# Patient Record
Sex: MALE | Race: WHITE | Employment: FULL TIME | URBAN - METROPOLITAN AREA
[De-identification: names, ages, dates, MRNs, and addresses within clinical notes are randomized per-mention and may not be internally consistent; named-entity substitution may affect disease eponyms.]

---

## 2020-03-05 ENCOUNTER — OFFICE VISIT (OUTPATIENT)
Dept: SLEEP MEDICINE | Age: 35
End: 2020-03-05

## 2020-03-05 VITALS
DIASTOLIC BLOOD PRESSURE: 88 MMHG | HEART RATE: 76 BPM | RESPIRATION RATE: 19 BRPM | TEMPERATURE: 98.4 F | WEIGHT: 210 LBS | SYSTOLIC BLOOD PRESSURE: 137 MMHG | BODY MASS INDEX: 27.83 KG/M2 | HEIGHT: 73 IN | OXYGEN SATURATION: 98 %

## 2020-03-05 DIAGNOSIS — G47.33 OBSTRUCTIVE SLEEP APNEA (ADULT) (PEDIATRIC): Primary | ICD-10-CM

## 2020-03-05 RX ORDER — FLUOXETINE HYDROCHLORIDE 40 MG/1
CAPSULE ORAL DAILY
COMMUNITY

## 2020-03-05 RX ORDER — DICYCLOMINE HYDROCHLORIDE 20 MG/1
20 TABLET ORAL EVERY 6 HOURS
COMMUNITY

## 2020-03-05 NOTE — PATIENT INSTRUCTIONS
[address] [facility] Seth. [place], [address]  Tel.  [phone]  Fax. 100 [facility] 60  [place], [address] [facility]  Tel.  [phone]  Fax. [phone] [address]  Tel.  [phone]  Fax. [phone]     Sleep Apnea: After Your Visit  Your Care Instructions  Sleep apnea occurs when you frequently stop breathing for 10 seconds or longer during sleep. It can be mild to severe, based on the number of times per hour that you stop breathing or have slowed breathing. Blocked or narrowed airways in your nose, mouth, or throat can cause sleep apnea. Your airway can become blocked when your throat muscles and tongue relax during sleep. Sleep apnea is common, occurring in 1 out of 20 individuals. Individuals having any of the following characteristics should be evaluated and treated right away due to high risk and detrimental consequences from untreated sleep apnea:  1. Obesity  2. Congestive Heart failure  3. Atrial Fibrillation  4. Uncontrolled Hypertension  5. Type II Diabetes  6. Night-time Arrhythmias  7. Stroke  8. Pulmonary Hypertension  9. High-risk Driving Populations (pilots, truck drivers, etc.)  10. Patients Considering Weight-loss Surgery    How do you know you have sleep apnea? You probably have sleep apnea if you answer 'yes' to 3 or more of the following questions:  S - Have you been told that you Snore? T - Are you often Tired during the day? O - Has anyone Observed you stop breathing while sleeping? P- Do you have (or are being treated for) high blood Pressure? B - Are you obese (Body Mass Index > 35)? A - Is your Age 48years old or older? N - Is your Neck size greater than 16 inches? G - Are you male Gender? A sleep physician can prescribe a breathing device that prevents tissues in the throat from blocking your airway.  Or your doctor may recommend using a dental device (oral breathing device) to help keep your airway open. In some cases, surgery may be needed to remove enlarged tissues in the throat. Follow-up care is a key part of your treatment and safety. Be sure to make and go to all appointments, and call your doctor if you are having problems. It's also a good idea to know your test results and keep a list of the medicines you take. How can you care for yourself at home? · Lose weight, if needed. It may reduce the number of times you stop breathing or have slowed breathing. · Go to bed at the same time every night. · Sleep on your side. It may stop mild apnea. If you tend to roll onto your back, sew a pocket in the back of your pajama top. Put a tennis ball into the pocket, and stitch the pocket shut. This will help keep you from sleeping on your back. · Avoid alcohol and medicines such as sleeping pills and sedatives before bed. · Do not smoke. Smoking can make sleep apnea worse. If you need help quitting, talk to your doctor about stop-smoking programs and medicines. These can increase your chances of quitting for good. · Prop up the head of your bed 4 to 6 inches by putting bricks under the legs of the bed. · Treat breathing problems, such as a stuffy nose, caused by a cold or allergies. · Use a continuous positive airway pressure (CPAP) breathing machine if lifestyle changes do not help your apnea and your doctor recommends it. The machine keeps your airway from closing when you sleep. · If CPAP does not help you, ask your doctor whether you should try other breathing machines. A bilevel positive airway pressure machine has two types of air pressureâone for breathing in and one for breathing out. Another device raises or lowers air pressure as needed while you breathe. · If your nose feels dry or bleeds when using one of these machines, talk with your doctor about increasing moisture in the air. A humidifier may help.   · If your nose is runny or stuffy from using a breathing machine, talk with your doctor about using decongestants or a corticosteroid nasal spray. When should you call for help? Watch closely for changes in your health, and be sure to contact your doctor if:  · You still have sleep apnea even though you have made lifestyle changes. · You are thinking of trying a device such as CPAP. · You are having problems using a CPAP or similar machine. Where can you learn more? Go to [employer]. Enter S852 in the search box to learn more about \"Sleep Apnea: After Your Visit. \"   © 8604-3104 Healthwise, Incorporated. Care instructions adapted under license by [facility] [employer] (which disclaims liability or warranty for this information). This care instruction is for use with your licensed healthcare professional. If you have questions about a medical condition or this instruction, always ask your healthcare professional. Santy Tubbs any warranty or liability for your use of this information. PROPER SLEEP HYGIENE    What to avoid  · Do not have drinks with caffeine, such as coffee or black tea, for 8 hours before bed. · Do not smoke or use other types of tobacco near bedtime. Nicotine is a stimulant and can keep you awake. · Avoid drinking alcohol late in the evening, because it can cause you to wake in the middle of the night. · Do not eat a big meal close to bedtime. If you are hungry, eat a light snack. · Do not drink a lot of water close to bedtime, because the need to urinate may wake you up during the night. · Do not read or watch TV in bed. Use the bed only for sleeping and sexual activity. What to try  · Go to bed at the same time every night, and wake up at the same time every morning. Do not take naps during the day. · Keep your bedroom quiet, dark, and cool. · Get regular exercise, but not within 3 to 4 hours of your bedtime. .  · Sleep on a comfortable pillow and mattress.   · If watching the clock makes you anxious, turn it facing away from you so you cannot see the time. · If you worry when you lie down, start a worry book. Well before bedtime, write down your worries, and then set the book and your concerns aside. · Try meditation or other relaxation techniques before you go to bed. · If you cannot fall asleep, get up and go to another room until you feel sleepy. Do something relaxing. Repeat your bedtime routine before you go to bed again. · Make your house quiet and calm about an hour before bedtime. Turn down the lights, turn off the TV, log off the computer, and turn down the volume on music. This can help you relax after a busy day. Drowsy Driving  The [address] [place] [address] Road Traffic Safety Administration cites drowsiness as a causing factor in more than 868,448 police reported crashes annually, resulting in 76,000 injuries and 1,500 deaths. Other surveys suggest 55% of people polled have driven while drowsy in the past year, 23% had fallen asleep but not crashed, 3% crashed, and 2% had and accident due to drowsy driving. Who is at risk? Young Drivers: One study of drowsy driving accidents states that 55% of the drivers were under 25 years. Of those, 75% were male. Shift Workers and Travelers: People who work overnight or travel across time zones frequently are at higher risk of experiencing Circadian Rhythm Disorders. They are trying to work and function when their body is programed to sleep. Sleep Deprived: Lack of sleep has a serious impact on your ability to pay attention or focus on a task. Consistently getting less than the average of 8 hours your body needs creates partial or cumulative sleep deprivation. Untreated Sleep Disorders: Sleep Apnea, Narcolepsy, R.L.S., and other sleep disorders (untreated) prevent a person from getting enough restful sleep. This leads to excessive daytime sleepiness and increases the risk for drowsy driving accidents by up to 7 times.   Medications / Alcohol: Even over the counter medications can cause drowsiness. Medications that impair a drivers attention should have a warning label. Alcohol naturally makes you sleepy and on its own can cause accidents. Combined with excessive drowsiness its effects are amplified. Signs of Drowsy Driving:   * You don't remember driving the last few miles   * You may drift out of your brandon   * You are unable to focus and your thoughts wander   * You may yawn more often than normal   * You have difficulty keeping your eyes open / nodding off   * Missing traffic signs, speeding, or tailgating  Prevention-   Good sleep hygiene, lifestyle and behavioral choices have the most impact on drowsy driving. There is no substitute for sleep and the average person requires 8 hours nightly. If you find yourself driving drowsy, stop and sleep. Consider the sleep hygiene tips provided during your visit as well. Medication Refill Policy: Refills for all medications require 1 week advance notice. Please have your pharmacy fax a refill request. We are unable to fax, or call in \"controled substance\" medications and you will need to pick these prescriptions up from our office. [employer] Activation    Thank you for requesting access to [employer]. Please follow the instructions below to securely access and download your online medical record. [employer] allows you to send messages to your doctor, view your test results, renew your prescriptions, schedule appointments, and more. How Do I Sign Up? 1. In your internet browser, go to [email or web address]. 2. Click on the First Time User? Click Here link in the Sign In box. You will see the New Member Sign Up page. 3. Enter your [employer] Access Code exactly as it appears below. You will not need to use this code after youve completed the sign-up process. If you do not sign up before the expiration date, you must request a new code.     [employer] Access Code: [email or web address]  Expires: 4/19/2020  4:39 PM (This is the date your [employer] access code will )    4. Enter the last four digits of your Social Security Number (xxxx) and Date of Birth (mm/dd/yyyy) as indicated and click Submit. You will be taken to the next sign-up page. 5. Create a [employer] Swedent ID. This will be your [employer] login ID and cannot be changed, so think of one that is secure and easy to remember. 6. Create a [employer] password. You can change your password at any time. 7. Enter your Password Reset Question and Answer. This can be used at a later time if you forget your password. 8. Enter your e-mail address. You will receive e-mail notification when new information is available in [address] 9. Click Sign Up. You can now view and download portions of your medical record. 10. Click the Download Summary menu link to download a portable copy of your medical information. Additional Information    If you have questions, please call [phone]. Remember, [employer] is NOT to be used for urgent needs. For medical emergencies, dial 911.

## 2020-03-05 NOTE — PROGRESS NOTES
217 [facility] Seth. [place], [address]  Tel.  [phone]  Fax. 100 [facility] 60  [place], [address] [facility]  Tel.  [phone]  Fax. [phone] [address]  Tel.  [phone]  Fax. [phone]         Subjective:      Leigh Anderson is an 30 y.o. male referred by Dr. Grace Duffy, for evaluation for a sleep disorder. He complains of snoring, snorting, periods of not breathing associated with excessive daytime sleepiness. Symptoms began several years ago, gradually worsening since that time. He usually can fall asleep in a few minutes. Family or house members note snoring, periods of not breathing. He denies falling asleep while driving. Leigh Anderson does wake up frequently at night. He is bothered by waking up too early and left unable to get back to sleep. He actually sleeps about 5 hours at night and wakes up about 3 times during the night. He does work shifts:  First Shift. Mycoo[employer] indicates he does get too little sleep at night. His bedtime is 0900. He awakens at 0500. He does take naps. He takes 4 naps a week lasting 3. He has the following observed behaviors: Loud snoring, Light snoring, Sleep talking, Pauses in breathing, Sitting up in bed while still asleep;  . Other remarks: vivid dreams     [place] Sleepiness Score: 15   which reflect moderate daytime drowsiness. Allergies   Allergen Reactions    Sulfa (Sulfonamide Antibiotics) Nausea and Vomiting         Current Outpatient Medications:     FLUoxetine (PROZAC) 40 mg capsule, Take  by mouth daily. , Disp: , Rfl:     dicyclomine (BENTYL) 20 mg tablet, Take 20 mg by mouth every six (6) hours. , Disp: , Rfl:      He  has a past medical history of Psychiatric disorder. He  has no past surgical history on file. He  reports that he has never smoked. He has never used smokeless tobacco. He reports previous alcohol use.      Review of Systems:  Constitutional:  +weight gain.  Eyes:  No blurred vision. CVS:  No significant chest pain  Pulm:  No significant shortness of breath  GI:  No significant nausea or vomiting  :  No significant nocturia  Musculoskeletal:  No significant joint pain at night  Skin:  No significant rashes  Neuro:  No significant dizziness   Psych:  Depression and anxiety controlled    Sleep Review of Systems: notable for no difficulty falling asleep; infrequent awakenings at night;  regular dreaming noted; no nightmares ; no early morning headaches; no memory problems; no concentration issues; no history of any automobile or occupational accidents due to daytime drowsiness. Objective:     Visit Vitals  /88 (BP 1 Location: Left arm, BP Patient Position: Sitting)   Pulse 76   Temp 98.4 °F (36.9 °C) (Oral)   Resp 19   Ht 6' 1\" (1.854 m)   Wt 210 lb (95.3 kg)   SpO2 98%   BMI 27.71 kg/m²         General:   Not in acute distress   Eyes:  Anicteric sclerae, no obvious strabismus   Nose:  No obvious nasal septum deviation    Oropharynx:   Class 3 oropharyngeal outlet, thick tongue base, enlarged and boggy uvula, low-lying soft palate, narrow tonsilo-pharyngeal pilars   Tonsils:   tonsils are present and normal   Neck:   Neck circ. in \"inches\": 16.5; midline trachea   Chest/Lungs:  Equal lung expansion, clear on auscultation    CVS:  Normal rate, regular rhythm; no JVD   Skin:  Warm to touch; no obvious rashes   Neuro:  No focal deficits ; no obvious tremor    Psych:  Normal affect,  normal countenance;          Assessment:       ICD-10-CM ICD-9-CM    1. Obstructive sleep apnea (adult) (pediatric) G47.33 327.23 SLEEP STUDY UNATTENDED, 4 CHANNEL         Plan:     * The patient currently has a Moderate Risk for having sleep apnea. STOP-BANG score 4. *PSG was ordered for initial evaluation. I have reviewed the different types of sleep studies. Attended sleep studies and home sleep apnea tests.  Home sleep testing tests only for the presence and severity of sleep apnea. he understands that if the HSAT does not provide reliable result(such as poor data/failed HSAT recording), he may have to repeat the HSAT or come in for an attended polysomnogram.   * He was provided information on sleep apnea including coresponding risk factors and the importance of proper treatment. * Counseling was provided regarding proper sleep hygiene and safe driving. Treatment options for sleep apnea were reviewed. I have counseled the patient regarding the benefits of weight loss. he is not against a trial of PAP if found to have significant sleep apnea. The treatment plan was reviewed with the patient in detail and reviewed with the patient and the lead technologist. he understands that the lead technologist will be calling him  with the results and assisting with the next step in the treatment plan as outlined today during the consultation with me. All of his questions were addressed. Thank you for allowing us to participate in your patient's medical care. We'll keep you updated on these investigations.   Electronically signed by    Cory Garcia MD  Diplomate in Sleep Medicine  [facility]

## 2020-03-06 ENCOUNTER — OFFICE VISIT (OUTPATIENT)
Dept: SLEEP MEDICINE | Age: 35
End: 2020-03-06

## 2020-03-06 ENCOUNTER — TELEPHONE (OUTPATIENT)
Dept: SLEEP MEDICINE | Age: 35
End: 2020-03-06

## 2020-03-06 VITALS
WEIGHT: 213 LBS | HEIGHT: 73 IN | RESPIRATION RATE: 20 BRPM | TEMPERATURE: 97.5 F | SYSTOLIC BLOOD PRESSURE: 131 MMHG | OXYGEN SATURATION: 98 % | DIASTOLIC BLOOD PRESSURE: 80 MMHG | HEART RATE: 64 BPM | BODY MASS INDEX: 28.23 KG/M2

## 2020-03-06 DIAGNOSIS — G47.33 OSA (OBSTRUCTIVE SLEEP APNEA): Primary | ICD-10-CM

## 2020-03-07 ENCOUNTER — HOSPITAL ENCOUNTER (OUTPATIENT)
Dept: SLEEP MEDICINE | Age: 35
Discharge: HOME | End: 2020-03-07
Payer: COMMERCIAL

## 2020-03-07 PROCEDURE — 95806 SLEEP STUDY UNATT&RESP EFFT: CPT | Performed by: INTERNAL MEDICINE

## 2020-03-16 ENCOUNTER — TELEPHONE (OUTPATIENT)
Dept: SLEEP MEDICINE | Age: 35
End: 2020-03-16

## 2020-03-16 DIAGNOSIS — G47.33 OBSTRUCTIVE SLEEP APNEA (ADULT) (PEDIATRIC): Primary | ICD-10-CM

## 2020-03-16 NOTE — TELEPHONE ENCOUNTER
Results of sleep study in R-drive  Lead tech to convey results to patient  HSAT results in R-drive. Test positive for severe sleep apnea. AHI 51/hour and lowest oxygen saturation was 75%. We had discussed treatment options at initial consultation. Based on the results of the home sleep apnea test, I believe a trial of APAP would be an effective mode of therapy. APAP order attached. he should be seen in the sleep disorder center 4-6 weeks after initiating PAP therapy. The APAP will have modem access so he can call the sleep center if he  has questions/concerns regarding the initial PAP settings. Front staff to Order PAP and call patient and let them know which DME company they should be hearing from after results reviewed with lead support technologist.   Schedule for first adherence visit in 6 weeks.

## 2020-03-19 ENCOUNTER — DOCUMENTATION ONLY (OUTPATIENT)
Dept: SLEEP MEDICINE | Age: 35
End: 2020-03-19

## 2020-03-26 ENCOUNTER — TELEPHONE (OUTPATIENT)
Dept: SLEEP MEDICINE | Age: 35
End: 2020-03-26

## 2020-07-14 ENCOUNTER — TELEPHONE (OUTPATIENT)
Dept: SLEEP MEDICINE | Age: 35
End: 2020-07-14

## 2020-07-14 ENCOUNTER — VIRTUAL VISIT (OUTPATIENT)
Dept: SLEEP MEDICINE | Age: 35
End: 2020-07-14

## 2020-07-14 ENCOUNTER — DOCUMENTATION ONLY (OUTPATIENT)
Dept: SLEEP MEDICINE | Age: 35
End: 2020-07-14

## 2020-07-14 DIAGNOSIS — G47.33 OBSTRUCTIVE SLEEP APNEA (ADULT) (PEDIATRIC): Primary | ICD-10-CM

## 2020-07-14 RX ORDER — ONDANSETRON 4 MG/1
4 TABLET, ORALLY DISINTEGRATING ORAL
COMMUNITY
Start: 2020-05-23

## 2020-07-14 NOTE — PATIENT INSTRUCTIONS
[address] [facility] [address] [place]. [place], [address]  Tel.  [phone]  Fax. [address] [facility] [address]  [place], [address] [facility] [address]  Tel.  [phone]  Fax. [phone] [address]  Tel.  [phone]  Fax. [phone]     PROPER SLEEP HYGIENE    What to avoid  · Do not have drinks with caffeine, such as coffee or black tea, for 8 hours before bed. · Do not smoke or use other types of tobacco near bedtime. Nicotine is a stimulant and can keep you awake. · Avoid drinking alcohol late in the evening, because it can cause you to wake in the middle of the night. · Do not eat a big meal close to bedtime. If you are hungry, eat a light snack. · Do not drink a lot of water close to bedtime, because the need to urinate may wake you up during the night. · Do not read or watch TV in bed. Use the bed only for sleeping and sexual activity. What to try  · Go to bed at the same time every night, and wake up at the same time every morning. Do not take naps during the day. · Keep your bedroom quiet, dark, and cool. · Get regular exercise, but not within 3 to 4 hours of your bedtime. .  · Sleep on a comfortable pillow and mattress. · If watching the clock makes you anxious, turn it facing away from you so you cannot see the time. · If you worry when you lie down, start a worry book. Well before bedtime, write down your worries, and then set the book and your concerns aside. · Try meditation or other relaxation techniques before you go to bed. · If you cannot fall asleep, get up and go to another room until you feel sleepy. Do something relaxing. Repeat your bedtime routine before you go to bed again. · Make your house quiet and calm about an hour before bedtime. Turn down the lights, turn off the TV, log off the computer, and turn down the volume on music. This can help you relax after a busy day.     Drowsy Driving  The [address] [place] [address] Road Traffic Safety Administration cites drowsiness as a causing factor in more than 602,704 police reported crashes annually, resulting in 76,000 injuries and 1,500 deaths. Other surveys suggest 55% of people polled have driven while drowsy in the past year, 23% had fallen asleep but not crashed, 3% crashed, and 2% had and accident due to drowsy driving. Who is at risk? Young Drivers: One study of drowsy driving accidents states that 55% of the drivers were under 25 years. Of those, 75% were male. Shift Workers and Travelers: People who work overnight or travel across time zones frequently are at higher risk of experiencing Circadian Rhythm Disorders. They are trying to work and function when their body is programed to sleep. Sleep Deprived: Lack of sleep has a serious impact on your ability to pay attention or focus on a task. Consistently getting less than the average of 8 hours your body needs creates partial or cumulative sleep deprivation. Untreated Sleep Disorders: Sleep Apnea, Narcolepsy, R.L.S., and other sleep disorders (untreated) prevent a person from getting enough restful sleep. This leads to excessive daytime sleepiness and increases the risk for drowsy driving accidents by up to 7 times. Medications / Alcohol: Even over the counter medications can cause drowsiness. Medications that impair a drivers attention should have a warning label. Alcohol naturally makes you sleepy and on its own can cause accidents. Combined with excessive drowsiness its effects are amplified. Signs of Drowsy Driving:   * You don't remember driving the last few miles   * You may drift out of your brandon   * You are unable to focus and your thoughts wander   * You may yawn more often than normal   * You have difficulty keeping your eyes open / nodding off   * Missing traffic signs, speeding, or tailgating  Prevention-   Good sleep hygiene, lifestyle and behavioral choices have the most impact on drowsy driving.  There is no substitute for sleep and the average person requires 8 hours nightly. If you find yourself driving drowsy, stop and sleep. Consider the sleep hygiene tips provided during your visit as well. Medication Refill Policy: Refills for all medications require 1 week advance notice. Please have your pharmacy fax a refill request. We are unable to fax, or call in \"controled substance\" medications and you will need to pick these prescriptions up from our office. [email or web address] Activation    Thank you for requesting access to [email or web address]. Please follow the instructions below to securely access and download your online medical record. [email or web address] allows you to send messages to your doctor, view your test results, renew your prescriptions, schedule appointments, and more. How Do I Sign Up? 1. In your internet browser, go to [email or web address]. 2. Click on the First Time User? Click Here link in the Sign In box. You will see the New Member Sign Up page. 3. Enter your [email or web address] Access Code exactly as it appears below. You will not need to use this code after youve completed the sign-up process. If you do not sign up before the expiration date, you must request a new code. [email or web address] Access Code: [email or web address]  Expires: 2020  8:41 AM (This is the date your [email or web address] access code will )    4. Enter the last four digits of your Social Security Number (xxxx) and Date of Birth (mm/dd/yyyy) as indicated and click Submit. You will be taken to the next sign-up page. 5. Create a [email or web address] ID. This will be your [email or web address] login ID and cannot be changed, so think of one that is secure and easy to remember. 6. Create a [employer] password. You can change your password at any time. 7. Enter your Password Reset Question and Answer. This can be used at a later time if you forget your password. 8. Enter your e-mail address. You will receive e-mail notification when new information is available in [address] 9. Click Sign Up.  You can now view and download portions of your medical record. 10. Click the Download Summary menu link to download a portable copy of your medical information. Additional Information    If you have questions, please call [phone]. Remember, [employer] is NOT to be used for urgent needs. For medical emergencies, dial 911.

## 2020-07-14 NOTE — TELEPHONE ENCOUNTER
I called to confirm his virtual visit for today. I said I would send link at 4:20 and if he was unavailable or preferred to reschedule, he can call office.

## 2020-07-14 NOTE — PROGRESS NOTES
[address] [facility] [place]. [place], [address]  Tel.  [phone]  Fax. 100 [facility] 60  [place], [address] [facility]  Tel.  [phone]  Fax. [phone] [address] [address]  Tel.  [phone]  Fax. [phone]       Telemedicine visit performed with verbal consent of the patient. Patient called and identity confirmed with 2 patient identifers    Patient was seen at home  Laura Dumont is a 37 y.o. male who was seen by synchronous (real-time) audio-video technology on 7/14/2020. Consent:  He and/or his healthcare decision maker is aware that this patient-initiated Telehealth encounter is the equivalent to a face to face encounter in the sleep disorder center and has provided verbal consent to proceed: Yes    I was at home while conducting this encounter. S>Arjun Bautista is a 37 y.o. male seen at this telemedicine visit for a positive airway pressure follow-up. He reports no problems using the device. He is 51% compliant over the past 90 days. The following problems are identified:    Drowsiness No, but still feels tired Problems exhaling no   Snoring no Forget to put on Yes, stopped using it lately   Mask Comfortable yes  Can't fall asleep no   Dry Mouth no Mask falls off no   Air Leaking A little Frequent awakenings no       Download reviewed. He ws not sure if he felt better on PAP therapy using full mask and heated tubing. He was wondering if he may need oxygen as his mom is on PAP and oxygen. Allergies   Allergen Reactions    Bactrim [Sulfamethoprim Ds] Nausea and Vomiting    Sulfa (Sulfonamide Antibiotics) Nausea and Vomiting       He has a current medication list which includes the following prescription(s): ondansetron, fluoxetine, dicyclomine, and clonazepam.. He  has a past medical history of Anxiety, Depression, Pain, dental, and Psychiatric disorder. O>      There were no vitals taken for this visit. Vital Signs: (As obtained by patient/caregiver at home)        Constitutional: [x] Appears well-developed and well-nourished [x] No apparent distress      [] Abnormal -     Mental status: [x] Alert and awake  [x] Oriented to person/place/time [x] Able to follow commands    [] Abnormal -     Eyes:   EOM    [x]  Normal    [] Abnormal -   Sclera  [x]  Normal    [] Abnormal -          Discharge [x]  None visible   [] Abnormal -     HENT: [x] Normocephalic, atraumatic  [] Abnormal -     External Ears [x] Normal  [] Abnormal -    Neck: [x] No visualized mass [] Abnormal -     Pulmonary/Chest: [x] Respiratory effort normal   [x] No visualized signs of difficulty breathing or respiratory distress        [] Abnormal -       Neurological:        [x] No Facial Asymmetry (Cranial nerve 7 motor function) (limited exam due to video visit)          [x] No gaze palsy        [] Abnormal -          Skin:        [x] No significant exanthematous lesions or discoloration noted on facial skin         [] Abnormal -            Psychiatric:       [x] Normal Affect [] Abnormal -        Other pertinent observable physical exam findings:-            A>    ICD-10-CM ICD-9-CM    1. Obstructive sleep apnea (adult) (pediatric)  G47.33 327.23 AMB SUPPLY ORDER     AHI = 51(3-20). On CPAP :  [id number]. Compliant:      no    Therapeutic Response:  Positive    P>   PAP continues to benefit patient and remains necessary for control of his sleep apnea. CPAP setting - continue APAP 5-15 cmH20     * We have recommended a dedicated weight loss through appropriate diet and an exercise regimen as significant weight reduction has been shown to reduce severity of obstructive sleep apnea. He was advised to try 30 days of night ly usage. If he does not feel significantly better, we will plan on an attended PAP titration. *   Follow-up and Dispositions    · Return in about 1 year (around 7/14/2021).          * He was asked to contact our office for any problems regarding PAP therapy. * Counseling was provided regarding the importance of regular PAP use and on proper sleep hygiene and safe driving. * Re-enforced proper and regular cleaning for the device. All of his questions were addressed. Pursuant to the emergency declaration under the [facility], [facility] waiver authority and the [employer] Act, this Virtual  Visit was conducted, with patient's consent, to reduce the patient's risk of exposure to COVID-19 and provide continuity of care for an established patient. Services were provided through a video synchronous discussion virtually to substitute for in-person clinic visit.     Monika Varela MD    Electronically signed by    Jayne Mckeon MD  Diplomate in Sleep Medicine  [facility]

## 2021-06-18 NOTE — TELEPHONE ENCOUNTER
Left message to review DME and schedule adherence visit [facility]

              [address] [place] [address]

Test Date:    2021               Test Time:    16:04:02

Pat Name:     CESAR BALDERAS            Department:   

Patient ID:   [id number]           Room:          

Gender:       M                        Technician:   

:          1955               Requested By: EDEN KONG

Order Number: [id number]           Reading MD:     

                                 Measurements

Intervals                              Axis          

Rate:         66                       P:            0

NY:           120                      QRS:          192

QRSD:         198                      T:            26

QT:           510                                    

QTc:          537                                    

                           Interpretive Statements

SINUS RHYTHM

WPW PATTERN, TYPE A

ABNORMAL RIGHT SUPERIOR AXIS DEVIATION

ABNORMAL ECG

RI6.02

No previous ECG available for comparison